# Patient Record
Sex: MALE | Race: WHITE
[De-identification: names, ages, dates, MRNs, and addresses within clinical notes are randomized per-mention and may not be internally consistent; named-entity substitution may affect disease eponyms.]

---

## 2021-05-06 ENCOUNTER — HOSPITAL ENCOUNTER (EMERGENCY)
Dept: HOSPITAL 53 - M ED | Age: 21
Discharge: HOME | End: 2021-05-06
Payer: COMMERCIAL

## 2021-05-06 VITALS — WEIGHT: 155.32 LBS | BODY MASS INDEX: 22.24 KG/M2 | HEIGHT: 70 IN

## 2021-05-06 VITALS — DIASTOLIC BLOOD PRESSURE: 68 MMHG | SYSTOLIC BLOOD PRESSURE: 131 MMHG
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
## 2021-05-06 DIAGNOSIS — S16.1XXA: Primary | ICD-10-CM
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
## 2021-05-21 ENCOUNTER — HOSPITAL ENCOUNTER (EMERGENCY)
Dept: HOSPITAL 53 - M ED | Age: 21
LOS: 1 days | Discharge: HOME | End: 2021-05-22
Payer: COMMERCIAL

## 2021-05-21 VITALS — HEIGHT: 69 IN | WEIGHT: 154.76 LBS | BODY MASS INDEX: 22.92 KG/M2

## 2021-05-21 DIAGNOSIS — R51.9: ICD-10-CM

## 2021-05-21 DIAGNOSIS — R50.83: Primary | ICD-10-CM

## 2021-05-21 DIAGNOSIS — T50.B95A: ICD-10-CM

## 2021-05-21 LAB — RSV RNA NPH QL NAA+PROBE: NEGATIVE

## 2021-05-21 PROCEDURE — 99283 EMERGENCY DEPT VISIT LOW MDM: CPT

## 2021-05-21 PROCEDURE — 87631 RESP VIRUS 3-5 TARGETS: CPT

## 2021-05-22 VITALS — DIASTOLIC BLOOD PRESSURE: 56 MMHG | SYSTOLIC BLOOD PRESSURE: 118 MMHG
